# Patient Record
Sex: FEMALE | Race: WHITE | Employment: FULL TIME | URBAN - METROPOLITAN AREA
[De-identification: names, ages, dates, MRNs, and addresses within clinical notes are randomized per-mention and may not be internally consistent; named-entity substitution may affect disease eponyms.]

---

## 2023-03-28 ENCOUNTER — TELEPHONE (OUTPATIENT)
Dept: ENDOCRINOLOGY CLINIC | Facility: CLINIC | Age: 38
End: 2023-03-28

## 2023-03-28 NOTE — TELEPHONE ENCOUNTER
Patient is calling to request a sooner appointment then first available in November since her appointment had to be rescheduled.  States that she has had abnormal labs with her pcp

## 2023-03-29 NOTE — TELEPHONE ENCOUNTER
Dr. Liv Martinez per below, okay to book for first available June/July or keep current appt in 11/9/23

## 2023-06-29 ENCOUNTER — OFFICE VISIT (OUTPATIENT)
Dept: ENDOCRINOLOGY CLINIC | Facility: CLINIC | Age: 38
End: 2023-06-29

## 2023-06-29 VITALS
WEIGHT: 217 LBS | SYSTOLIC BLOOD PRESSURE: 132 MMHG | DIASTOLIC BLOOD PRESSURE: 87 MMHG | HEART RATE: 90 BPM | BODY MASS INDEX: 36 KG/M2

## 2023-06-29 DIAGNOSIS — R53.83 FATIGUE, UNSPECIFIED TYPE: ICD-10-CM

## 2023-06-29 DIAGNOSIS — E04.9 THYROID ENLARGEMENT: ICD-10-CM

## 2023-06-29 DIAGNOSIS — E88.81 METABOLIC SYNDROME: Primary | ICD-10-CM

## 2023-06-29 PROCEDURE — 3079F DIAST BP 80-89 MM HG: CPT | Performed by: INTERNAL MEDICINE

## 2023-06-29 PROCEDURE — 3075F SYST BP GE 130 - 139MM HG: CPT | Performed by: INTERNAL MEDICINE

## 2023-06-29 PROCEDURE — 99204 OFFICE O/P NEW MOD 45 MIN: CPT | Performed by: INTERNAL MEDICINE

## 2023-06-29 RX ORDER — TIRZEPATIDE 2.5 MG/.5ML
2.5 INJECTION, SOLUTION SUBCUTANEOUS WEEKLY
Qty: 2 ML | Refills: 0 | Status: SHIPPED | OUTPATIENT
Start: 2023-06-29

## 2023-06-29 RX ORDER — SEMAGLUTIDE 0.68 MG/ML
0.5 INJECTION, SOLUTION SUBCUTANEOUS WEEKLY
COMMUNITY
Start: 2023-05-15

## 2023-06-30 ENCOUNTER — HOSPITAL ENCOUNTER (OUTPATIENT)
Dept: ULTRASOUND IMAGING | Facility: HOSPITAL | Age: 38
Discharge: HOME | End: 2023-06-30
Attending: INTERNAL MEDICINE
Payer: COMMERCIAL

## 2023-06-30 DIAGNOSIS — E04.9 THYROID ENLARGEMENT: ICD-10-CM

## 2023-06-30 PROCEDURE — 82533 TOTAL CORTISOL: CPT

## 2023-06-30 PROCEDURE — 76536 US EXAM OF HEAD AND NECK: CPT | Performed by: INTERNAL MEDICINE

## 2023-07-01 ENCOUNTER — LAB ENCOUNTER (OUTPATIENT)
Dept: LAB | Age: 38
End: 2023-07-01
Attending: INTERNAL MEDICINE
Payer: COMMERCIAL

## 2023-07-01 DIAGNOSIS — R53.83 FATIGUE, UNSPECIFIED TYPE: ICD-10-CM

## 2023-07-01 DIAGNOSIS — E88.81 METABOLIC SYNDROME: ICD-10-CM

## 2023-07-01 LAB
ANION GAP SERPL CALC-SCNC: 6 MMOL/L (ref 0–18)
BUN BLD-MCNC: 13 MG/DL (ref 7–18)
CALCIUM BLD-MCNC: 9.1 MG/DL (ref 8.5–10.1)
CHLORIDE SERPL-SCNC: 106 MMOL/L (ref 98–112)
CO2 SERPL-SCNC: 25 MMOL/L (ref 21–32)
CREAT BLD-MCNC: 0.62 MG/DL
ESTRADIOL SERPL-MCNC: 52.3 PG/ML
FASTING STATUS PATIENT QL REPORTED: YES
FSH SERPL-ACNC: 5.4 MIU/ML
GFR SERPLBLD BASED ON 1.73 SQ M-ARVRAT: 117 ML/MIN/1.73M2 (ref 60–?)
GLUCOSE BLD-MCNC: 90 MG/DL (ref 70–99)
INSULIN SERPL-ACNC: 17.8 MU/L (ref 3–25)
LH SERPL-ACNC: 3.7 MIU/ML
OSMOLALITY SERPL CALC.SUM OF ELEC: 284 MOSM/KG (ref 275–295)
POTASSIUM SERPL-SCNC: 3.7 MMOL/L (ref 3.5–5.1)
SODIUM SERPL-SCNC: 137 MMOL/L (ref 136–145)

## 2023-07-01 PROCEDURE — 83002 ASSAY OF GONADOTROPIN (LH): CPT

## 2023-07-01 PROCEDURE — 83001 ASSAY OF GONADOTROPIN (FSH): CPT

## 2023-07-01 PROCEDURE — 80048 BASIC METABOLIC PNL TOTAL CA: CPT

## 2023-07-01 PROCEDURE — 82670 ASSAY OF TOTAL ESTRADIOL: CPT

## 2023-07-01 PROCEDURE — 36415 COLL VENOUS BLD VENIPUNCTURE: CPT

## 2023-07-01 PROCEDURE — 83520 IMMUNOASSAY QUANT NOS NONAB: CPT

## 2023-07-01 PROCEDURE — 83525 ASSAY OF INSULIN: CPT

## 2023-07-01 PROCEDURE — 84410 TESTOSTERONE BIOAVAILABLE: CPT

## 2023-07-05 LAB — LEPTIN: 49.6 NG/ML

## 2023-07-06 ENCOUNTER — TELEPHONE (OUTPATIENT)
Dept: ENDOCRINOLOGY CLINIC | Facility: CLINIC | Age: 38
End: 2023-07-06

## 2023-07-06 NOTE — TELEPHONE ENCOUNTER
Tirzepatide [facility]) 2.5 MG/0.5ML Subcutaneous Solution Pen-injector, Inject 2.5 mg into the skin once a week., Disp: 2 mL, Rfl:   Key: [id number]

## 2023-07-06 NOTE — TELEPHONE ENCOUNTER
Received PA form for Mounjaro 2.5mg. completed questions and faxed back with most recent chart notes.

## 2023-07-07 LAB
SEX HORM BIND GLOB: 105 NMOL/L
TESTOST % FREE+WEAK BND: 8.8 %
TESTOST FREE+WEAK BND: 1.7 NG/DL
TESTOSTERONE TOT /MS: 19.5 NG/DL

## 2023-07-09 ENCOUNTER — PATIENT MESSAGE (OUTPATIENT)
Dept: ENDOCRINOLOGY CLINIC | Facility: CLINIC | Age: 38
End: 2023-07-09

## 2023-07-10 LAB — SALIVARY CORTISOL MS: 0.01 UG/DL

## 2023-07-10 RX ORDER — TIRZEPATIDE 5 MG/.5ML
5 INJECTION, SOLUTION SUBCUTANEOUS WEEKLY
Qty: 2 ML | Refills: 0 | Status: SHIPPED | OUTPATIENT
Start: 2023-07-10

## 2023-07-12 ENCOUNTER — TELEPHONE (OUTPATIENT)
Dept: ENDOCRINOLOGY CLINIC | Facility: CLINIC | Age: 38
End: 2023-07-12

## 2023-07-12 NOTE — TELEPHONE ENCOUNTER
Per MyChart encounter 7/9/23, patient was able to get [facility] prescription.

## 2023-07-12 NOTE — TELEPHONE ENCOUNTER
Current Outpatient Medications   Medication Sig Dispense Refill    Tirzepatide (MOUNJARO) 5 MG/0.5ML Subcutaneous Solution Pen-injector Inject 5 mg into the skin once a week. 2 mL 0     Per pharmacy a prior auth is required.   Key: [id number]

## 2023-07-28 RX ORDER — TIRZEPATIDE 5 MG/.5ML
5 INJECTION, SOLUTION SUBCUTANEOUS WEEKLY
Qty: 6 ML | Refills: 0 | Status: SHIPPED | OUTPATIENT
Start: 2023-07-28

## 2023-07-28 NOTE — TELEPHONE ENCOUNTER
LOV: 6/29/2023    RTC: 4 Months    FU: 11/6/2023    Last Refill: 7/10/2023    3 Month Supply Pending

## 2023-08-30 RX ORDER — TIRZEPATIDE 7.5 MG/.5ML
1 INJECTION, SOLUTION SUBCUTANEOUS
Qty: 2 ML | Refills: 0 | Status: SHIPPED | OUTPATIENT
Start: 2023-08-30

## 2023-09-27 RX ORDER — TIRZEPATIDE 7.5 MG/.5ML
1 INJECTION, SOLUTION SUBCUTANEOUS
Qty: 2 ML | Refills: 2 | Status: SHIPPED | OUTPATIENT
Start: 2023-09-27

## 2023-09-27 RX ORDER — TIRZEPATIDE 7.5 MG/.5ML
1 INJECTION, SOLUTION SUBCUTANEOUS
Qty: 2 ML | Refills: 0 | Status: SHIPPED | OUTPATIENT
Start: 2023-09-27

## 2023-10-30 ENCOUNTER — PATIENT MESSAGE (OUTPATIENT)
Dept: ENDOCRINOLOGY CLINIC | Facility: CLINIC | Age: 38
End: 2023-10-30

## 2023-11-01 RX ORDER — TIRZEPATIDE 10 MG/.5ML
10 INJECTION, SOLUTION SUBCUTANEOUS WEEKLY
Qty: 2 ML | Refills: 0 | Status: SHIPPED | OUTPATIENT
Start: 2023-11-01

## 2023-11-06 ENCOUNTER — OFFICE VISIT (OUTPATIENT)
Dept: ENDOCRINOLOGY CLINIC | Facility: CLINIC | Age: 38
End: 2023-11-06

## 2023-11-06 VITALS
HEART RATE: 99 BPM | WEIGHT: 196 LBS | BODY MASS INDEX: 32 KG/M2 | SYSTOLIC BLOOD PRESSURE: 125 MMHG | DIASTOLIC BLOOD PRESSURE: 82 MMHG

## 2023-11-06 DIAGNOSIS — E04.2 MULTINODULAR GOITER: ICD-10-CM

## 2023-11-06 DIAGNOSIS — E88.810 METABOLIC SYNDROME: Primary | ICD-10-CM

## 2023-11-06 PROCEDURE — 3079F DIAST BP 80-89 MM HG: CPT | Performed by: INTERNAL MEDICINE

## 2023-11-06 PROCEDURE — 99213 OFFICE O/P EST LOW 20 MIN: CPT | Performed by: INTERNAL MEDICINE

## 2023-11-06 PROCEDURE — 3074F SYST BP LT 130 MM HG: CPT | Performed by: INTERNAL MEDICINE

## 2023-11-27 RX ORDER — TIRZEPATIDE 10 MG/.5ML
10 INJECTION, SOLUTION SUBCUTANEOUS WEEKLY
Qty: 2 ML | Refills: 0 | Status: SHIPPED | OUTPATIENT
Start: 2023-11-27

## 2023-12-22 RX ORDER — TIRZEPATIDE 10 MG/.5ML
10 INJECTION, SOLUTION SUBCUTANEOUS WEEKLY
Qty: 2 ML | Refills: 0 | Status: SHIPPED | OUTPATIENT
Start: 2023-12-22

## 2024-01-19 ENCOUNTER — PATIENT MESSAGE (OUTPATIENT)
Dept: ENDOCRINOLOGY CLINIC | Facility: CLINIC | Age: 39
End: 2024-01-19

## 2024-01-20 NOTE — TELEPHONE ENCOUNTER
From: Nidhi Loyola  To: Quiana Ware  Sent: 1/19/2024 4:28 PM CST  Subject: Mounjaro    Hi Dr. Ware,    Hope your holidays were great and happy new year!     I am looking for a little guidance on my mounjaro journey. I’ve been on it since July, and am currently on 10mg since November. My current weight as of this morning is 182.8lbs (staring weight was 217) but I’ve been stuck at this weight (give or take a pound) for the last 4 weeks. I am exercising 3-4 days a week, consuming about 100-120 grams of protein, staying at around 1500 estephanie, and drinking about 80 oz of water. Is it normal to plateau? Is there something I need to adjust?   I hit a plateau before and you had brought me up to the 10mg which helped. Is that my body saying it needs to go up again or do I give it more time?    Thank you for your feedback!    Nidhi Loyola

## 2024-01-22 RX ORDER — TIRZEPATIDE 12.5 MG/.5ML
12.5 INJECTION, SOLUTION SUBCUTANEOUS WEEKLY
Qty: 2 ML | Refills: 5 | Status: SHIPPED | OUTPATIENT
Start: 2024-01-22

## 2024-03-11 ENCOUNTER — OFFICE VISIT (OUTPATIENT)
Dept: ENDOCRINOLOGY CLINIC | Facility: CLINIC | Age: 39
End: 2024-03-11

## 2024-03-11 VITALS
SYSTOLIC BLOOD PRESSURE: 133 MMHG | HEART RATE: 76 BPM | BODY MASS INDEX: 30 KG/M2 | DIASTOLIC BLOOD PRESSURE: 82 MMHG | WEIGHT: 182 LBS

## 2024-03-11 DIAGNOSIS — E88.810 METABOLIC SYNDROME: Primary | ICD-10-CM

## 2024-03-11 DIAGNOSIS — E04.2 MULTINODULAR GOITER: ICD-10-CM

## 2024-03-11 PROCEDURE — 99214 OFFICE O/P EST MOD 30 MIN: CPT | Performed by: INTERNAL MEDICINE

## 2024-03-11 PROCEDURE — 3075F SYST BP GE 130 - 139MM HG: CPT | Performed by: INTERNAL MEDICINE

## 2024-03-11 PROCEDURE — 3079F DIAST BP 80-89 MM HG: CPT | Performed by: INTERNAL MEDICINE

## 2024-03-11 NOTE — PROGRESS NOTES
Name: Nidhi Loyola  Date: 3/11/2024    Referring Physician: No ref. provider found    No chief complaint on file.      HISTORY OF PRESENT ILLNESS   Nidhi Loyola is a 39 year old female who presents for No chief complaint on file.    40 y/o F presents for follow up evaluation of weight gain and fatigue.  She started to work at home in 2020 and since that time she has gained approximately 50lbs.  She has started aggressive diet plan in the past 8 weeks counting all calories and CHO but only lost 5lbs.     She was having some irregular bleeding at that time in 2021 and underwent endometrial ablation in 2021.  Her bleeding pattern has improved however she continues to have other symptoms.     She continues to have significant fatigue +brittle nails, +hair loss, +mental fogginess, +increased anxiety, +cold intolerance, No hot flashes, no night sweats.  She does note some insomnia, she did have sleep study and no apnea.  +acne.      3/2024  She has been maintained on Mounjaro 12.5mg subcutaneous weekly.  She has been able to lose a total of 37lbs.  She is tolerating well.  No nausea.  The fatigue is improved and some improvement in anxiety.  Her job changed which might contribute to change in anxiety.  She continues to have mental fogginess and low libido.     No menstrual cycles after ablation.     No family h/o hormone abnormalities.     REVIEW OF SYSTEMS  Eyes: no change in vision  Neurologic: no headache, generalized or focal weakness or numbness.  Head: normal  ENT: normal  Lungs: no shortness of breath, wheezing or PRESSLEY  Cardiovascular:  no chest pain or palpitations  Gastrointestinal:  no abdominal pain, bowel movement problems  Musculoskeletal: no muscle pain or arthralgia  /Gyne: no frequency or discomfort while urinating  Psychiatric:  no acute distress, anxiety  or depression  Skin: normal moisturized skin    Medications:     Current Outpatient Medications:     Tirzepatide (MOUNJARO) 12.5 MG/0.5ML  Subcutaneous Solution Pen-injector, Inject 12.5 mg into the skin once a week., Disp: 2 mL, Rfl: 5    Tirzepatide (MOUNJARO) 10 MG/0.5ML Subcutaneous Solution Pen-injector, Inject 10 mg into the skin once a week., Disp: 2 mL, Rfl: 0     Allergies:   No Known Allergies    Social History:   Social History     Socioeconomic History    Marital status:    Tobacco Use    Smoking status: Never    Smokeless tobacco: Never   Vaping Use    Vaping Use: Never used   Substance and Sexual Activity    Alcohol use: Yes     Comment: occasional    Drug use: Never       Medical History:   Past Medical History:   Diagnosis Date    Placental abruption (HCC)        Surgical history:   Past Surgical History:   Procedure Laterality Date    ANESTH, SECTION      , , 2018       PHYSICAL EXAMINATION:  /82   Pulse 76   Wt 182 lb (82.6 kg)   BMI 29.83 kg/m²     General Appearance:  Alert, in no acute distress, well developed  Eyes: normal conjunctivae, sclera.  Ears/Nose/Mouth/Throat/Neck:  normal hearing, normal speech and diffusely enlarged thyroid gland  Musculoskeletal:  normal muscle strength and tone  PV: normal pulses of carotids, pedals  Skin:  normal moisture and skin texture  Hair & Nails:  normal scalp hair     Neuro:  sensory grossly intact and motor grossly intact  Psychiatric:  oriented to time, self, and place  Nutritional:  no abnormal weight gain or loss    ASSESSMENT/PLAN:    Metabolic Syndrome  - Discussed diagnosis with patient  - Discussed central weight gain  - Hormone work up was negative, all testing was normal   - Discussed GLP-1 therapy is the best option for weight management   - Mounjaro is now currently covered  - Will continue current dose of Mounjaro 12.5mg subcutaneous weekly   - Hormone work up was negative   - Check Vitamin D     2. Multinodular Goiter  -Discussed common occurrence of thyroid nodules in the population approx 50-60% of the population  -Discussed risk of  malignancy is approx 3-5%, 95-97% of nodules are benign  -Discussed recommendation to perform FNA biopsy of nodules greater than 1cm in size  -Discussed that if nodule is benign then plan to follow with yearly thyroid ultrasound  -Repeat US 6/2024 ordered   -Recheck Thyroid level     3. Low Libido   - Persistent symptoms  - Unclear cause  - Discussed possible evaluation with gynecology vs. OCP vs [facility]       RTC 4 months     3/11/2024  Quiana Ware MD

## 2024-03-12 ENCOUNTER — LAB ENCOUNTER (OUTPATIENT)
Dept: LAB | Age: 39
End: 2024-03-12
Attending: INTERNAL MEDICINE
Payer: COMMERCIAL

## 2024-03-12 DIAGNOSIS — E04.2 MULTINODULAR GOITER: ICD-10-CM

## 2024-03-12 LAB
T4 FREE SERPL-MCNC: 1.4 NG/DL (ref 0.8–1.7)
TSI SER-ACNC: 0.48 MIU/ML (ref 0.55–4.78)
VIT D+METAB SERPL-MCNC: 24.1 NG/ML (ref 30–100)

## 2024-03-12 PROCEDURE — 82306 VITAMIN D 25 HYDROXY: CPT

## 2024-03-12 PROCEDURE — 84439 ASSAY OF FREE THYROXINE: CPT

## 2024-03-12 PROCEDURE — 36415 COLL VENOUS BLD VENIPUNCTURE: CPT

## 2024-03-12 PROCEDURE — 84443 ASSAY THYROID STIM HORMONE: CPT

## 2024-03-16 ENCOUNTER — HOSPITAL ENCOUNTER (OUTPATIENT)
Dept: ULTRASOUND IMAGING | Age: 39
Discharge: HOME | End: 2024-03-16
Attending: INTERNAL MEDICINE
Payer: COMMERCIAL

## 2024-03-16 DIAGNOSIS — E04.2 MULTINODULAR GOITER: ICD-10-CM

## 2024-03-16 PROCEDURE — 76536 US EXAM OF HEAD AND NECK: CPT | Performed by: INTERNAL MEDICINE

## 2024-04-03 NOTE — TELEPHONE ENCOUNTER
Endocrine Refill protocol for oral and injectable diabetic medications    Protocol Criteria:    -Appointment with Endocrinology completed in the last 6 months or scheduled in the next 3 months    -A1c result <8.5% in the past 6 months      Verify the above has been completed or scheduled in the appropriate timeline. If so can send a 90 day supply with 1 refill.     Last completed office visit:03/11/24  Next scheduled Follow up: 07/22/24  Last A1c result: 4.8%

## 2024-04-04 RX ORDER — TIRZEPATIDE 12.5 MG/.5ML
12.5 INJECTION, SOLUTION SUBCUTANEOUS WEEKLY
Qty: 2 ML | Refills: 5 | Status: SHIPPED | OUTPATIENT
Start: 2024-04-04

## 2024-05-06 RX ORDER — TIRZEPATIDE 12.5 MG/.5ML
12.5 INJECTION, SOLUTION SUBCUTANEOUS WEEKLY
Qty: 2 ML | Refills: 5 | Status: SHIPPED | OUTPATIENT
Start: 2024-05-06

## 2024-05-06 NOTE — TELEPHONE ENCOUNTER
Endocrine Refill protocol for oral and injectable diabetic medications    Protocol Criteria:    -Appointment with Endocrinology completed in the last 6 months or scheduled in the next 3 months    -A1c result <8.5% in the past 6 months      Verify the above has been completed or scheduled in the appropriate timeline. If so can send a 90 day supply with 1 refill.     Last completed office visit: 03/11/24  Next scheduled Follow up: 07/22/24  Last A1c result: Last A1c value was  % done  .4.8

## 2024-07-22 ENCOUNTER — OFFICE VISIT (OUTPATIENT)
Dept: ENDOCRINOLOGY CLINIC | Facility: CLINIC | Age: 39
End: 2024-07-22

## 2024-07-22 VITALS
BODY MASS INDEX: 29 KG/M2 | WEIGHT: 176 LBS | HEART RATE: 78 BPM | DIASTOLIC BLOOD PRESSURE: 78 MMHG | SYSTOLIC BLOOD PRESSURE: 117 MMHG

## 2024-07-22 DIAGNOSIS — E04.2 MULTINODULAR GOITER: ICD-10-CM

## 2024-07-22 DIAGNOSIS — E88.810 METABOLIC SYNDROME: Primary | ICD-10-CM

## 2024-07-22 DIAGNOSIS — E55.9 VITAMIN D DEFICIENCY: ICD-10-CM

## 2024-07-22 DIAGNOSIS — E88.819 INSULIN RESISTANCE: ICD-10-CM

## 2024-07-22 PROCEDURE — 3074F SYST BP LT 130 MM HG: CPT | Performed by: INTERNAL MEDICINE

## 2024-07-22 PROCEDURE — 99214 OFFICE O/P EST MOD 30 MIN: CPT | Performed by: INTERNAL MEDICINE

## 2024-07-22 PROCEDURE — 3078F DIAST BP <80 MM HG: CPT | Performed by: INTERNAL MEDICINE

## 2024-07-22 RX ORDER — TIRZEPATIDE 10 MG/.5ML
10 INJECTION, SOLUTION SUBCUTANEOUS WEEKLY
Qty: 2 ML | Refills: 3 | Status: SHIPPED | OUTPATIENT
Start: 2024-07-22

## 2024-07-22 NOTE — PROGRESS NOTES
Name: Nidhi Loyola  Date: 7/22/2024    Referring Physician: No ref. provider found    Chief Complaint   Patient presents with    Follow - Up       HISTORY OF PRESENT ILLNESS   Nidhi Loyola is a 39 year old female who presents for   Chief Complaint   Patient presents with    Follow - Up     38 y/o F presents for follow up evaluation of weight gain and fatigue.  She started to work at home in 2020 and since that time she has gained approximately 50lbs.  She has started aggressive diet plan in the past 8 weeks counting all calories and CHO but only lost 5lbs.     She was having some irregular bleeding at that time in 2021 and underwent endometrial ablation in 2021.  Her bleeding pattern has improved however she continues to have other symptoms.      7/2024   She has been maintained on Mounjaro 12.5mg subcutaneous weekly.  She has been able to lose a total of 50lbs.  She is tolerating well.  No nausea.  The fatigue is improved and some improvement in anxiety.  Although she does have significant fatigue.     She is maintained on Vitamin D 5000 units daily.     No menstrual cycles after ablation.     No family h/o hormone abnormalities.     REVIEW OF SYSTEMS  Eyes: no change in vision  Neurologic: no headache, generalized or focal weakness or numbness.  Head: normal  ENT: normal  Lungs: no shortness of breath, wheezing or PRESSLEY  Cardiovascular:  no chest pain or palpitations  Gastrointestinal:  no abdominal pain, bowel movement problems  Musculoskeletal: no muscle pain or arthralgia  /Gyne: no frequency or discomfort while urinating  Psychiatric:  no acute distress, anxiety  or depression  Skin: normal moisturized skin    Medications:     Current Outpatient Medications:     Tirzepatide (MOUNJARO) 12.5 MG/0.5ML Subcutaneous Solution Pen-injector, Inject 12.5 mg into the skin once a week., Disp: 2 mL, Rfl: 5    Tirzepatide (MOUNJARO) 10 MG/0.5ML Subcutaneous Solution Pen-injector, Inject 10 mg into the skin once a week.  (Patient not taking: Reported on 3/11/2024), Disp: 2 mL, Rfl: 0     Allergies:   No Known Allergies    Social History:   Social History     Socioeconomic History    Marital status:    Tobacco Use    Smoking status: Never    Smokeless tobacco: Never   Vaping Use    Vaping status: Never Used   Substance and Sexual Activity    Alcohol use: Yes     Comment: occasional    Drug use: Never       Medical History:   Past Medical History:    Placental abruption (HCC)       Surgical history:   Past Surgical History:   Procedure Laterality Date    Anesth, section      , ,        PHYSICAL EXAMINATION:  /78   Pulse 78   Wt 176 lb (79.8 kg)   BMI 28.84 kg/m²     General Appearance:  Alert, in no acute distress, well developed  Eyes: normal conjunctivae, sclera.  Ears/Nose/Mouth/Throat/Neck:  normal hearing, normal speech and diffusely enlarged thyroid gland  Musculoskeletal:  normal muscle strength and tone  PV: normal pulses of carotids, pedals  Skin:  normal moisture and skin texture  Hair & Nails:  normal scalp hair     Neuro:  sensory grossly intact and motor grossly intact  Psychiatric:  oriented to time, self, and place  Nutritional:  no abnormal weight gain or loss    ASSESSMENT/PLAN:    Metabolic Syndrome  - Discussed diagnosis with patient  - Discussed central weight gain  - Hormone work up was negative, all testing was normal   - Discussed GLP-1 therapy is the best option for weight management   - Mounjaro is now currently covered  - Decrease Mounjaro to 10mg subcutaneous weekly due to some mild nausea   - Hormone work up was negative   - RECheck Vitamin D     2. Multinodular Goiter  -Discussed common occurrence of thyroid nodules in the population approx 50-60% of the population  -Discussed risk of malignancy is approx 3-5%, 95-97% of nodules are benign  -Discussed recommendation to perform FNA biopsy of nodules greater than 1cm in size  -Discussed that if nodule is benign then plan  to follow with yearly thyroid ultrasound  -Thyroid US stable 2024   -Recheck TFTs     RTC 6 months     7/22/2024  Quiana Ware MD

## 2024-07-25 ENCOUNTER — LAB ENCOUNTER (OUTPATIENT)
Dept: LAB | Age: 39
End: 2024-07-25
Attending: INTERNAL MEDICINE
Payer: COMMERCIAL

## 2024-07-25 DIAGNOSIS — E04.2 MULTINODULAR GOITER: ICD-10-CM

## 2024-07-25 DIAGNOSIS — E55.9 VITAMIN D DEFICIENCY: ICD-10-CM

## 2024-07-25 LAB
T4 FREE SERPL-MCNC: 1.4 NG/DL (ref 0.8–1.7)
TSI SER-ACNC: 0.56 MIU/ML (ref 0.55–4.78)
VIT B12 SERPL-MCNC: 558 PG/ML (ref 211–911)
VIT D+METAB SERPL-MCNC: 39.9 NG/ML (ref 30–100)

## 2024-07-25 PROCEDURE — 82607 VITAMIN B-12: CPT

## 2024-07-25 PROCEDURE — 36415 COLL VENOUS BLD VENIPUNCTURE: CPT

## 2024-07-25 PROCEDURE — 82306 VITAMIN D 25 HYDROXY: CPT

## 2024-07-25 PROCEDURE — 84439 ASSAY OF FREE THYROXINE: CPT

## 2024-07-25 PROCEDURE — 84443 ASSAY THYROID STIM HORMONE: CPT

## 2024-08-16 ENCOUNTER — TELEPHONE (OUTPATIENT)
Dept: ENDOCRINOLOGY CLINIC | Facility: CLINIC | Age: 39
End: 2024-08-16

## 2024-08-16 NOTE — TELEPHONE ENCOUNTER
Current Outpatient Medications   Medication Sig Dispense Refill    Tirzepatide (MOUNJARO) 10 MG/0.5ML Subcutaneous Solution Pen-injector Inject 10 mg into the skin once a week. 2 mL 3       KEY; [id number]

## 2024-08-17 NOTE — TELEPHONE ENCOUNTER
Medication PA Requested:   Mounjaro 10 mg                                                       CoverMyMeds Used: Yes  Key: [id number]   Quantity: 2 ml  Day Supply: 30  Sig: Inject 10 mg weekly  DX Code:  E88.81                                     CPT code (if applicable):   Case Number/Pending Ref#:

## 2024-08-19 NOTE — TELEPHONE ENCOUNTER
Medication PA Requested:   Mounjaro 10 mg                                                       CoverMyMeds Used: No  Key:   Quantity: 2 ml  Day Supply: 30  Sig: Inject 10 mg weekly  DX Code:  E88.81               Electronic prior authorization submitted, last office visit 7/22 and Labs 7/25  Awaiting determination

## 2024-08-21 NOTE — TELEPHONE ENCOUNTER
Endocrine Refill protocol for oral and injectable diabetic medications    Protocol Criteria:  PASSED  -Appointment with Endocrinology completed in the last 6 months or scheduled in the next 3 months    -A1c result below 8.5% in the past 6 months      Verify the above has been completed or scheduled in the appropriate timeline. If so can send a 90 day supply with 1 refill.     Last completed office visit: 7/22/2024 Quiana Ware MD   Next scheduled Follow up: 1/23/25     Last A1c result: Last A1c value was 4.8 % done  .

## 2024-08-22 RX ORDER — TIRZEPATIDE 10 MG/.5ML
10 INJECTION, SOLUTION SUBCUTANEOUS WEEKLY
Qty: 2 ML | Refills: 3 | Status: SHIPPED | OUTPATIENT
Start: 2024-08-22

## 2024-08-23 RX ORDER — TIRZEPATIDE 10 MG/.5ML
10 INJECTION, SOLUTION SUBCUTANEOUS WEEKLY
Qty: 2 ML | Refills: 3 | Status: SHIPPED | OUTPATIENT
Start: 2024-08-23

## 2024-08-23 NOTE — TELEPHONE ENCOUNTER
Received fax from [employer] dated on 8/21/24 stating the MOUNJARO 10MG/0.5ML  up to 4 pens per 28 days has been denied due to pt not having type 2 diabetes  or hemoglobin A1c greater or equal to 6.5%. denial placed in provider folder.    ID#[id number]  [facility]#[id number]

## 2024-08-23 NOTE — TELEPHONE ENCOUNTER
Called and notified pt of Zepbound prescription. Pt states she is worried that her insurance will not cover Zepbound    PA submitted through [employer]

## 2024-08-28 NOTE — TELEPHONE ENCOUNTER
Received letter of determination from [email or web address] the pharmacy claim reviewer for [facility] and [facility]     Denial Reason:    The requested drug must be covered under your pharmacy benefit.  We received a request for a weight management drug, which is not covered under your pharmacy benefit.  This information can be found in the exclusions section of your most current Member Handbook/Certificate of Coverage of Drug List.        Routed denial to Dr. Ware for review.  Placed in (RED) denial folder for ENDO nurses

## 2024-10-10 RX ORDER — TIRZEPATIDE 10 MG/.5ML
10 INJECTION, SOLUTION SUBCUTANEOUS WEEKLY
Qty: 2 ML | Refills: 3 | Status: SHIPPED | OUTPATIENT
Start: 2024-10-10

## 2024-10-10 NOTE — TELEPHONE ENCOUNTER
Endocrine Refill protocol for weight management    Protocol Criteria:  PASSED Reason: N/A    If below requirement is met, send a 90-day supply with 1 refill per provider protocol.    Verify appointment with Endocrinology completed in the last 6 months or scheduled in the next 3 months.    Last completed office visit:7/22/2024 Quiana Ware MD   Next scheduled Follow up:   Future Appointments   Date Time Provider Department Center   1/23/2025  1:15 PM Quiana Ware MD [facility]

## 2024-11-29 RX ORDER — TIRZEPATIDE 10 MG/.5ML
10 INJECTION, SOLUTION SUBCUTANEOUS WEEKLY
Qty: 6 ML | Refills: 1 | Status: SHIPPED | OUTPATIENT
Start: 2024-11-29

## 2024-11-29 NOTE — TELEPHONE ENCOUNTER
Endocrine Refill protocol for weight management    Protocol Criteria:  PASSED     If below requirement is met, send a 90-day supply with 1 refill per provider protocol.    Verify appointment with Endocrinology completed in the last 6 months or scheduled in the next 3 months.    Last completed office visit:7/22/2024 Quiana Ware MD   Next scheduled Follow up:   Future Appointments   Date Time Provider Department Center   1/23/2025  1:15 PM Quiana Ware MD [facility]      See TE from 8/16/24. Insurance denied coverage for both mounjaro (patient does not have diabetes) and zepbound (weight loss meds not covered on formulary). Patient was notified at that time but did not respond. Unsure if patient is cash paying. [employer] message sent for clarification prior to refilling.

## 2025-03-25 ENCOUNTER — PATIENT MESSAGE (OUTPATIENT)
Dept: ENDOCRINOLOGY CLINIC | Facility: CLINIC | Age: 40
End: 2025-03-25

## 2025-03-28 RX ORDER — TIRZEPATIDE 2.5 MG/.5ML
2.5 INJECTION, SOLUTION SUBCUTANEOUS WEEKLY
Qty: 2 ML | Refills: 0 | Status: SHIPPED | OUTPATIENT
Start: 2025-03-28

## 2025-03-28 NOTE — TELEPHONE ENCOUNTER
Dr. Ware,     Patient asking for script for Zepbound cashpay and to restart at lower dose. Script pended for starting dose of 2.5 mg. Patient notified.

## 2025-03-28 NOTE — TELEPHONE ENCOUNTER
Endocrine Refill protocol for weight management    Protocol Criteria:  PASSED Reason: N/A    If below requirement is met, send a 90-day supply with 1 refill per provider protocol.    Verify appointment with Endocrinology completed in the last 6 months or scheduled in the next 3 months.    Last completed office visit:7/22/2024 Quiana Ware MD   Next scheduled Follow up:   Future Appointments   Date Time Provider Department Center   5/16/2025  9:30 AM Quiana Ware MD [facility]

## 2025-04-17 RX ORDER — TIRZEPATIDE 2.5 MG/.5ML
INJECTION, SOLUTION SUBCUTANEOUS
Qty: 2 ML | Refills: 0 | Status: SHIPPED | OUTPATIENT
Start: 2025-04-17

## 2025-04-17 NOTE — TELEPHONE ENCOUNTER
Endocrine Refill protocol for weight management    Protocol Criteria:  PASSED Reason: N/A    If below requirement is met, send a 90-day supply with 1 refill per provider protocol.    Verify appointment with Endocrinology completed in the last 6 months or scheduled in the next 3 months.    Last completed office visit:7/22/2024 Quiana Ware MD   Next scheduled Follow up:   Future Appointments   Date Time Provider Department Center   5/16/2025  9:30 AM Quiana Ware MD [facility]

## 2025-05-06 ENCOUNTER — PATIENT MESSAGE (OUTPATIENT)
Dept: ENDOCRINOLOGY CLINIC | Facility: CLINIC | Age: 40
End: 2025-05-06

## 2025-05-06 DIAGNOSIS — E88.810 METABOLIC SYNDROME: Primary | ICD-10-CM

## 2025-05-07 RX ORDER — TIRZEPATIDE 2.5 MG/.5ML
INJECTION, SOLUTION SUBCUTANEOUS
Qty: 2 ML | Refills: 0 | Status: SHIPPED | OUTPATIENT
Start: 2025-05-07

## 2025-05-07 RX ORDER — TIRZEPATIDE 5 MG/.5ML
0.5 INJECTION, SOLUTION SUBCUTANEOUS WEEKLY
Qty: 2 ML | Refills: 1 | Status: SHIPPED | OUTPATIENT
Start: 2025-05-07

## 2025-05-07 NOTE — TELEPHONE ENCOUNTER
Dr. Ware --    See  message below, pt is requesting to increase zepbound 2.5mg to 5mg. RX pended.

## 2025-05-07 NOTE — TELEPHONE ENCOUNTER
Endocrine Refill protocol for weight management    Protocol Criteria:  PASSED Reason: N/A    If below requirement is met, send a 90-day supply with 1 refill per provider protocol.    Verify appointment with Endocrinology completed in the last 6 months or scheduled in the next 3 months.    Last completed office visit:7/22/2024 Quiana Ware MD   Next scheduled Follow up:   Future Appointments   Date Time Provider Department Center   5/16/2025  9:30 AM Quiana Ware MD [facility]

## 2025-05-16 ENCOUNTER — OFFICE VISIT (OUTPATIENT)
Dept: ENDOCRINOLOGY CLINIC | Facility: CLINIC | Age: 40
End: 2025-05-16
Payer: COMMERCIAL

## 2025-05-16 VITALS
SYSTOLIC BLOOD PRESSURE: 130 MMHG | BODY MASS INDEX: 31 KG/M2 | HEART RATE: 75 BPM | WEIGHT: 190 LBS | DIASTOLIC BLOOD PRESSURE: 87 MMHG

## 2025-05-16 DIAGNOSIS — R53.83 FATIGUE, UNSPECIFIED TYPE: ICD-10-CM

## 2025-05-16 DIAGNOSIS — E04.2 MULTINODULAR GOITER: Primary | ICD-10-CM

## 2025-05-16 DIAGNOSIS — E88.810 METABOLIC SYNDROME: ICD-10-CM

## 2025-05-16 PROCEDURE — 3075F SYST BP GE 130 - 139MM HG: CPT | Performed by: INTERNAL MEDICINE

## 2025-05-16 PROCEDURE — 99214 OFFICE O/P EST MOD 30 MIN: CPT | Performed by: INTERNAL MEDICINE

## 2025-05-16 PROCEDURE — 3079F DIAST BP 80-89 MM HG: CPT | Performed by: INTERNAL MEDICINE

## 2025-05-16 NOTE — PROGRESS NOTES
Name: Nidhi Loyola  Date: 5/16/2025    Referring Physician: No ref. provider found    Chief Complaint   Patient presents with    Follow - Up       HISTORY OF PRESENT ILLNESS   Nidhi Loyola is a 40 year old female who presents for   Chief Complaint   Patient presents with    Follow - Up     41 y/o F presents for follow up evaluation of weight gain and fatigue.  She started to work at home in 2020 and since that time she has gained approximately 50lbs.  She has started aggressive diet plan in the past 8 weeks counting all calories and CHO but only lost 5lbs.     She was having some irregular bleeding at that time in 2021 and underwent endometrial ablation in 2021.  Her bleeding pattern has improved however she continues to have other symptoms.      5/2025   Since last visit she was off Mounjaro for 4 months however she gained 15lbs.  She did just restart Zepbound 2.5mg but weight was stable so increased to 5mg weekly. Her dose just increased last week.  She is tolerating well.  No nausea.      Of note she is seeing integrative [facility] clinic and started on Testosterone injections twice per week.  As of right now she has noted any improvement in fatigue or libido.      She is maintained on Vitamin D 5000 units daily.     No menstrual cycles after ablation.     No family h/o hormone abnormalities.     REVIEW OF SYSTEMS  Eyes: no change in vision  Neurologic: no headache, generalized or focal weakness or numbness.  Head: normal  ENT: normal  Lungs: no shortness of breath, wheezing or PRESSLEY  Cardiovascular:  no chest pain or palpitations  Gastrointestinal:  no abdominal pain, bowel movement problems  Musculoskeletal: no muscle pain or arthralgia  /Gyne: no frequency or discomfort while urinating  Psychiatric:  no acute distress, anxiety  or depression  Skin: normal moisturized skin    Medications:     Current Outpatient Medications:     ZEPBOUND 2.5 MG/0.5ML Subcutaneous Solution, INJECT 0.5 ML (2.5 MG) UNDER THE SKIN  ONCE WEEKLY (0.5ML= 50 UNITS) (Patient not taking: Reported on 2025), Disp: 2 mL, Rfl: 0    Tirzepatide-Weight Management (ZEPBOUND) 5 MG/0.5ML Subcutaneous Solution, Inject 0.5 mL into the skin once a week., Disp: 2 mL, Rfl: 1    Tirzepatide-Weight Management (ZEPBOUND) 10 MG/0.5ML Subcutaneous Solution Auto-injector, Inject 10 mg into the skin once a week. (Patient not taking: Reported on 2025), Disp: 6 mL, Rfl: 1    Tirzepatide (MOUNJARO) 10 MG/0.5ML Subcutaneous Solution Pen-injector, Inject 10 mg into the skin once a week. (Patient not taking: Reported on 2025), Disp: 2 mL, Rfl: 3     Allergies:   No Known Allergies    Social History:   Social History     Socioeconomic History    Marital status:    Tobacco Use    Smoking status: Never    Smokeless tobacco: Never   Vaping Use    Vaping status: Never Used   Substance and Sexual Activity    Alcohol use: Yes     Comment: occasional    Drug use: Never       Medical History:   Past Medical History:    Placental abruption (HCC)       Surgical history:   Past Surgical History:   Procedure Laterality Date    Anesth, section      , ,        PHYSICAL EXAMINATION:  /87   Pulse 75   Wt 190 lb (86.2 kg)   BMI 31.14 kg/m²     General Appearance:  Alert, in no acute distress, well developed  Eyes: normal conjunctivae, sclera.  Ears/Nose/Mouth/Throat/Neck:  normal hearing, normal speech and diffusely enlarged thyroid gland  Musculoskeletal:  normal muscle strength and tone  PV: normal pulses of carotids, pedals  Skin:  normal moisture and skin texture  Hair & Nails:  normal scalp hair     Neuro:  sensory grossly intact and motor grossly intact  Psychiatric:  oriented to time, self, and place  Nutritional:  no abnormal weight gain or loss    ASSESSMENT/PLAN:    Metabolic Syndrome  - Discussed diagnosis with patient  - Discussed central weight gain  - Hormone work up was negative, all testing was normal   - Discussed GLP-1  therapy is the best option for weight management   - Mounjaro no longer covered  - She did transition to Zepbound 5mg subcutaneous weekly and tolerating well   - Hormone work up was negative   - RECheck Vitamin D, B12    2. Multinodular Goiter  -Discussed common occurrence of thyroid nodules in the population approx 50-60% of the population  -Discussed risk of malignancy is approx 3-5%, 95-97% of nodules are benign  -Discussed recommendation to perform FNA biopsy of nodules greater than 1cm in size  -Discussed that if nodule is benign then plan to follow with yearly thyroid ultrasound  -Thyroid US stable 2024 -->recheck 2026   -Recheck TFTs     3. Fatigue  - Concerned about persistent symptoms  - Recheck Cortisol, FSH, LH, Estradiol, Testosterone     RTC 6 months     5/16/2025  Quiana Ware MD

## 2025-05-28 ENCOUNTER — LAB ENCOUNTER (OUTPATIENT)
Dept: LAB | Age: 40
End: 2025-05-28
Attending: INTERNAL MEDICINE
Payer: COMMERCIAL

## 2025-05-28 DIAGNOSIS — R53.83 FATIGUE, UNSPECIFIED TYPE: ICD-10-CM

## 2025-05-28 DIAGNOSIS — E88.810 METABOLIC SYNDROME: ICD-10-CM

## 2025-05-28 DIAGNOSIS — E04.2 MULTINODULAR GOITER: ICD-10-CM

## 2025-05-28 LAB
CORTIS SERPL-MCNC: 6.9 UG/DL
ESTRADIOL SERPL-MCNC: 42.7 PG/ML
FSH SERPL-ACNC: 9.3 MIU/ML
LH SERPL-ACNC: 5.7 MIU/ML
T4 FREE SERPL-MCNC: 1.4 NG/DL (ref 0.8–1.7)
TSI SER-ACNC: 0.33 UIU/ML (ref 0.55–4.78)
VIT B12 SERPL-MCNC: 696 PG/ML (ref 211–911)
VIT D+METAB SERPL-MCNC: 41.2 NG/ML (ref 30–100)

## 2025-05-28 PROCEDURE — 36415 COLL VENOUS BLD VENIPUNCTURE: CPT

## 2025-05-28 PROCEDURE — 84410 TESTOSTERONE BIOAVAILABLE: CPT

## 2025-05-28 PROCEDURE — 82533 TOTAL CORTISOL: CPT

## 2025-05-28 PROCEDURE — 83002 ASSAY OF GONADOTROPIN (LH): CPT

## 2025-05-28 PROCEDURE — 82670 ASSAY OF TOTAL ESTRADIOL: CPT

## 2025-05-28 PROCEDURE — 82306 VITAMIN D 25 HYDROXY: CPT

## 2025-05-28 PROCEDURE — 82607 VITAMIN B-12: CPT

## 2025-05-28 PROCEDURE — 83001 ASSAY OF GONADOTROPIN (FSH): CPT

## 2025-05-28 PROCEDURE — 84439 ASSAY OF FREE THYROXINE: CPT

## 2025-05-28 PROCEDURE — 84443 ASSAY THYROID STIM HORMONE: CPT

## 2025-05-29 ENCOUNTER — PATIENT MESSAGE (OUTPATIENT)
Dept: ENDOCRINOLOGY CLINIC | Facility: CLINIC | Age: 40
End: 2025-05-29

## 2025-06-02 ENCOUNTER — PATIENT MESSAGE (OUTPATIENT)
Dept: ENDOCRINOLOGY CLINIC | Facility: CLINIC | Age: 40
End: 2025-06-02

## 2025-06-02 RX ORDER — TIRZEPATIDE 7.5 MG/.5ML
7.5 INJECTION, SOLUTION SUBCUTANEOUS WEEKLY
Qty: 6 ML | Refills: 0 | Status: SHIPPED | OUTPATIENT
Start: 2025-06-02

## 2025-06-02 NOTE — TELEPHONE ENCOUNTER
Pt requesting to increase to 7.5mg, pt states not losing weight on 5mg.       Endocrine Refill protocol for weight management    Protocol Criteria:  PASSED Reason: N/A    If below requirement is met, send a 90-day supply with 1 refill per provider protocol.    Verify appointment with Endocrinology completed in the last 6 months or scheduled in the next 3 months.    Last completed office visit:5/16/2025 Quiana Ware MD   Next scheduled Follow up:   Future Appointments   Date Time Provider Department Center   11/19/2025 10:45 AM Quiana Ware MD [facility]

## 2025-06-03 LAB
SEX HORM BIND GLOB: 155 NMOL/L
TESTOST % FREE+WEAK BND: 7.7 %
TESTOST FREE+WEAK BND: 10.8 NG/DL
TESTOSTERONE TOT /MS: 140.2 NG/DL

## 2025-07-08 RX ORDER — TIRZEPATIDE 7.5 MG/.5ML
7.5 INJECTION, SOLUTION SUBCUTANEOUS WEEKLY
Qty: 6 ML | Refills: 0 | Status: SHIPPED | OUTPATIENT
Start: 2025-07-08